# Patient Record
Sex: FEMALE | Race: WHITE | NOT HISPANIC OR LATINO
[De-identification: names, ages, dates, MRNs, and addresses within clinical notes are randomized per-mention and may not be internally consistent; named-entity substitution may affect disease eponyms.]

---

## 2023-11-09 ENCOUNTER — ASOB RESULT (OUTPATIENT)
Age: 28
End: 2023-11-09

## 2023-11-09 ENCOUNTER — TRANSCRIPTION ENCOUNTER (OUTPATIENT)
Age: 28
End: 2023-11-09

## 2023-11-09 ENCOUNTER — APPOINTMENT (OUTPATIENT)
Dept: ANTEPARTUM | Facility: CLINIC | Age: 28
End: 2023-11-09
Payer: COMMERCIAL

## 2023-11-09 PROCEDURE — 76802 OB US < 14 WKS ADDL FETUS: CPT

## 2023-11-09 PROCEDURE — 76817 TRANSVAGINAL US OBSTETRIC: CPT

## 2023-11-09 PROCEDURE — 76801 OB US < 14 WKS SINGLE FETUS: CPT

## 2023-11-20 ENCOUNTER — APPOINTMENT (OUTPATIENT)
Dept: ANTEPARTUM | Facility: CLINIC | Age: 28
End: 2023-11-20
Payer: COMMERCIAL

## 2023-11-20 ENCOUNTER — ASOB RESULT (OUTPATIENT)
Age: 28
End: 2023-11-20

## 2023-11-20 PROCEDURE — 36415 COLL VENOUS BLD VENIPUNCTURE: CPT

## 2023-11-20 PROCEDURE — 76814 OB US NUCHAL MEAS ADD-ON: CPT

## 2023-11-20 PROCEDURE — 76813 OB US NUCHAL MEAS 1 GEST: CPT

## 2023-11-29 ENCOUNTER — APPOINTMENT (OUTPATIENT)
Dept: ANTEPARTUM | Facility: CLINIC | Age: 28
End: 2023-11-29
Payer: COMMERCIAL

## 2023-11-29 ENCOUNTER — ASOB RESULT (OUTPATIENT)
Age: 28
End: 2023-11-29

## 2023-11-29 PROCEDURE — 76945 ECHO GUIDE VILLUS SAMPLING: CPT

## 2023-11-29 PROCEDURE — 59015 CHORION BIOPSY: CPT | Mod: 59

## 2023-12-19 ENCOUNTER — APPOINTMENT (OUTPATIENT)
Dept: ANTEPARTUM | Facility: CLINIC | Age: 28
End: 2023-12-19
Payer: COMMERCIAL

## 2023-12-19 ENCOUNTER — ASOB RESULT (OUTPATIENT)
Age: 28
End: 2023-12-19

## 2023-12-19 PROCEDURE — 76805 OB US >/= 14 WKS SNGL FETUS: CPT

## 2023-12-19 PROCEDURE — 76817 TRANSVAGINAL US OBSTETRIC: CPT

## 2024-01-23 ENCOUNTER — APPOINTMENT (OUTPATIENT)
Dept: ANTEPARTUM | Facility: CLINIC | Age: 29
End: 2024-01-23
Payer: COMMERCIAL

## 2024-01-23 ENCOUNTER — ASOB RESULT (OUTPATIENT)
Age: 29
End: 2024-01-23

## 2024-01-23 PROCEDURE — 76817 TRANSVAGINAL US OBSTETRIC: CPT

## 2024-01-23 PROCEDURE — 76811 OB US DETAILED SNGL FETUS: CPT

## 2024-01-23 PROCEDURE — 76812 OB US DETAILED ADDL FETUS: CPT

## 2024-02-05 ENCOUNTER — APPOINTMENT (OUTPATIENT)
Dept: ANTEPARTUM | Facility: CLINIC | Age: 29
End: 2024-02-05
Payer: COMMERCIAL

## 2024-02-05 ENCOUNTER — ASOB RESULT (OUTPATIENT)
Age: 29
End: 2024-02-05

## 2024-02-05 PROCEDURE — 76817 TRANSVAGINAL US OBSTETRIC: CPT

## 2024-02-05 PROCEDURE — 76815 OB US LIMITED FETUS(S): CPT

## 2024-02-27 ENCOUNTER — APPOINTMENT (OUTPATIENT)
Dept: ANTEPARTUM | Facility: CLINIC | Age: 29
End: 2024-02-27

## 2024-03-05 ENCOUNTER — ASOB RESULT (OUTPATIENT)
Age: 29
End: 2024-03-05

## 2024-03-05 ENCOUNTER — APPOINTMENT (OUTPATIENT)
Dept: ANTEPARTUM | Facility: CLINIC | Age: 29
End: 2024-03-05
Payer: COMMERCIAL

## 2024-03-05 PROCEDURE — 76819 FETAL BIOPHYS PROFIL W/O NST: CPT | Mod: 59

## 2024-03-05 PROCEDURE — 76816 OB US FOLLOW-UP PER FETUS: CPT

## 2024-03-05 PROCEDURE — 76820 UMBILICAL ARTERY ECHO: CPT | Mod: 59

## 2024-03-05 PROCEDURE — 76817 TRANSVAGINAL US OBSTETRIC: CPT

## 2024-03-19 ENCOUNTER — APPOINTMENT (OUTPATIENT)
Dept: ANTEPARTUM | Facility: CLINIC | Age: 29
End: 2024-03-19
Payer: COMMERCIAL

## 2024-03-19 ENCOUNTER — ASOB RESULT (OUTPATIENT)
Age: 29
End: 2024-03-19

## 2024-03-19 PROCEDURE — 76816 OB US FOLLOW-UP PER FETUS: CPT

## 2024-03-19 PROCEDURE — 76817 TRANSVAGINAL US OBSTETRIC: CPT

## 2024-03-19 PROCEDURE — 76821 MIDDLE CEREBRAL ARTERY ECHO: CPT | Mod: 59

## 2024-03-19 PROCEDURE — 76820 UMBILICAL ARTERY ECHO: CPT | Mod: 59

## 2024-03-19 PROCEDURE — 76819 FETAL BIOPHYS PROFIL W/O NST: CPT | Mod: 59

## 2024-03-27 ENCOUNTER — APPOINTMENT (OUTPATIENT)
Dept: ANTEPARTUM | Facility: CLINIC | Age: 29
End: 2024-03-27
Payer: COMMERCIAL

## 2024-03-27 ENCOUNTER — ASOB RESULT (OUTPATIENT)
Age: 29
End: 2024-03-27

## 2024-03-27 PROCEDURE — 76821 MIDDLE CEREBRAL ARTERY ECHO: CPT

## 2024-03-27 PROCEDURE — 76820 UMBILICAL ARTERY ECHO: CPT

## 2024-03-27 PROCEDURE — 76819 FETAL BIOPHYS PROFIL W/O NST: CPT | Mod: 59

## 2024-04-02 ENCOUNTER — ASOB RESULT (OUTPATIENT)
Age: 29
End: 2024-04-02

## 2024-04-02 ENCOUNTER — APPOINTMENT (OUTPATIENT)
Dept: ANTEPARTUM | Facility: CLINIC | Age: 29
End: 2024-04-02
Payer: COMMERCIAL

## 2024-04-02 PROBLEM — Z00.00 ENCOUNTER FOR PREVENTIVE HEALTH EXAMINATION: Status: ACTIVE | Noted: 2024-04-02

## 2024-04-02 PROCEDURE — 76817 TRANSVAGINAL US OBSTETRIC: CPT

## 2024-04-02 PROCEDURE — 76816 OB US FOLLOW-UP PER FETUS: CPT

## 2024-04-02 PROCEDURE — 76821 MIDDLE CEREBRAL ARTERY ECHO: CPT | Mod: 59

## 2024-04-02 PROCEDURE — 76820 UMBILICAL ARTERY ECHO: CPT | Mod: 59

## 2024-04-02 PROCEDURE — 76819 FETAL BIOPHYS PROFIL W/O NST: CPT | Mod: 59

## 2024-04-09 ENCOUNTER — APPOINTMENT (OUTPATIENT)
Dept: ANTEPARTUM | Facility: CLINIC | Age: 29
End: 2024-04-09

## 2024-04-16 ENCOUNTER — APPOINTMENT (OUTPATIENT)
Dept: ANTEPARTUM | Facility: CLINIC | Age: 29
End: 2024-04-16
Payer: COMMERCIAL

## 2024-04-16 ENCOUNTER — ASOB RESULT (OUTPATIENT)
Age: 29
End: 2024-04-16

## 2024-04-16 PROCEDURE — 76819 FETAL BIOPHYS PROFIL W/O NST: CPT

## 2024-04-16 PROCEDURE — 76820 UMBILICAL ARTERY ECHO: CPT | Mod: 59

## 2024-04-16 PROCEDURE — 76816 OB US FOLLOW-UP PER FETUS: CPT | Mod: 59

## 2024-04-16 PROCEDURE — 76817 TRANSVAGINAL US OBSTETRIC: CPT

## 2024-04-23 ENCOUNTER — APPOINTMENT (OUTPATIENT)
Dept: ANTEPARTUM | Facility: CLINIC | Age: 29
End: 2024-04-23

## 2024-04-30 ENCOUNTER — APPOINTMENT (OUTPATIENT)
Dept: ANTEPARTUM | Facility: CLINIC | Age: 29
End: 2024-04-30
Payer: COMMERCIAL

## 2024-04-30 ENCOUNTER — ASOB RESULT (OUTPATIENT)
Age: 29
End: 2024-04-30

## 2024-04-30 PROCEDURE — 76820 UMBILICAL ARTERY ECHO: CPT | Mod: 59

## 2024-04-30 PROCEDURE — 76816 OB US FOLLOW-UP PER FETUS: CPT

## 2024-04-30 PROCEDURE — 76819 FETAL BIOPHYS PROFIL W/O NST: CPT | Mod: 59

## 2024-05-02 ENCOUNTER — APPOINTMENT (OUTPATIENT)
Dept: ORTHOPEDIC SURGERY | Facility: CLINIC | Age: 29
End: 2024-05-02

## 2024-05-09 ENCOUNTER — APPOINTMENT (OUTPATIENT)
Dept: ANTEPARTUM | Facility: CLINIC | Age: 29
End: 2024-05-09
Payer: COMMERCIAL

## 2024-05-09 ENCOUNTER — ASOB RESULT (OUTPATIENT)
Age: 29
End: 2024-05-09

## 2024-05-09 PROCEDURE — 76815 OB US LIMITED FETUS(S): CPT

## 2024-05-09 PROCEDURE — 76820 UMBILICAL ARTERY ECHO: CPT | Mod: 59

## 2024-05-09 PROCEDURE — 76819 FETAL BIOPHYS PROFIL W/O NST: CPT

## 2024-05-14 ENCOUNTER — APPOINTMENT (OUTPATIENT)
Dept: ANTEPARTUM | Facility: CLINIC | Age: 29
End: 2024-05-14
Payer: COMMERCIAL

## 2024-05-14 ENCOUNTER — ASOB RESULT (OUTPATIENT)
Age: 29
End: 2024-05-14

## 2024-05-14 PROCEDURE — 76820 UMBILICAL ARTERY ECHO: CPT | Mod: 59

## 2024-05-14 PROCEDURE — 76819 FETAL BIOPHYS PROFIL W/O NST: CPT

## 2024-05-14 PROCEDURE — 76816 OB US FOLLOW-UP PER FETUS: CPT

## 2024-05-16 ENCOUNTER — OUTPATIENT (OUTPATIENT)
Dept: OUTPATIENT SERVICES | Facility: HOSPITAL | Age: 29
LOS: 1 days | End: 2024-05-16
Payer: COMMERCIAL

## 2024-05-16 ENCOUNTER — TRANSCRIPTION ENCOUNTER (OUTPATIENT)
Age: 29
End: 2024-05-16

## 2024-05-16 VITALS
HEART RATE: 91 BPM | SYSTOLIC BLOOD PRESSURE: 111 MMHG | RESPIRATION RATE: 16 BRPM | OXYGEN SATURATION: 100 % | DIASTOLIC BLOOD PRESSURE: 67 MMHG | TEMPERATURE: 98 F

## 2024-05-16 DIAGNOSIS — O26.899 OTHER SPECIFIED PREGNANCY RELATED CONDITIONS, UNSPECIFIED TRIMESTER: ICD-10-CM

## 2024-05-16 LAB
ALBUMIN SERPL ELPH-MCNC: 3.6 G/DL — SIGNIFICANT CHANGE UP (ref 3.3–5)
ALP SERPL-CCNC: 196 U/L — HIGH (ref 40–120)
ALT FLD-CCNC: 10 U/L — SIGNIFICANT CHANGE UP (ref 10–45)
ANION GAP SERPL CALC-SCNC: 14 MMOL/L — SIGNIFICANT CHANGE UP (ref 5–17)
AST SERPL-CCNC: 15 U/L — SIGNIFICANT CHANGE UP (ref 10–40)
BASOPHILS # BLD AUTO: 0.04 K/UL — SIGNIFICANT CHANGE UP (ref 0–0.2)
BASOPHILS NFR BLD AUTO: 0.4 % — SIGNIFICANT CHANGE UP (ref 0–2)
BILIRUB SERPL-MCNC: 0.3 MG/DL — SIGNIFICANT CHANGE UP (ref 0.2–1.2)
BUN SERPL-MCNC: 7 MG/DL — SIGNIFICANT CHANGE UP (ref 7–23)
CALCIUM SERPL-MCNC: 9.5 MG/DL — SIGNIFICANT CHANGE UP (ref 8.4–10.5)
CHLORIDE SERPL-SCNC: 104 MMOL/L — SIGNIFICANT CHANGE UP (ref 96–108)
CO2 SERPL-SCNC: 20 MMOL/L — LOW (ref 22–31)
CREAT ?TM UR-MCNC: 26 MG/DL — SIGNIFICANT CHANGE UP
CREAT SERPL-MCNC: 0.62 MG/DL — SIGNIFICANT CHANGE UP (ref 0.5–1.3)
EGFR: 123 ML/MIN/1.73M2 — SIGNIFICANT CHANGE UP
EOSINOPHIL # BLD AUTO: 0.05 K/UL — SIGNIFICANT CHANGE UP (ref 0–0.5)
EOSINOPHIL NFR BLD AUTO: 0.5 % — SIGNIFICANT CHANGE UP (ref 0–6)
FIBRINOGEN PPP-MCNC: 702 MG/DL — HIGH (ref 200–445)
GLUCOSE SERPL-MCNC: 101 MG/DL — HIGH (ref 70–99)
HCT VFR BLD CALC: 32.5 % — LOW (ref 34.5–45)
HGB BLD-MCNC: 11.4 G/DL — LOW (ref 11.5–15.5)
IMM GRANULOCYTES NFR BLD AUTO: 2 % — HIGH (ref 0–0.9)
LDH SERPL L TO P-CCNC: 170 U/L — SIGNIFICANT CHANGE UP (ref 50–242)
LYMPHOCYTES # BLD AUTO: 1.38 K/UL — SIGNIFICANT CHANGE UP (ref 1–3.3)
LYMPHOCYTES # BLD AUTO: 12.8 % — LOW (ref 13–44)
MCHC RBC-ENTMCNC: 32.5 PG — SIGNIFICANT CHANGE UP (ref 27–34)
MCHC RBC-ENTMCNC: 35.1 GM/DL — SIGNIFICANT CHANGE UP (ref 32–36)
MCV RBC AUTO: 92.6 FL — SIGNIFICANT CHANGE UP (ref 80–100)
MONOCYTES # BLD AUTO: 0.51 K/UL — SIGNIFICANT CHANGE UP (ref 0–0.9)
MONOCYTES NFR BLD AUTO: 4.7 % — SIGNIFICANT CHANGE UP (ref 2–14)
NEUTROPHILS # BLD AUTO: 8.61 K/UL — HIGH (ref 1.8–7.4)
NEUTROPHILS NFR BLD AUTO: 79.6 % — HIGH (ref 43–77)
NRBC # BLD: 0 /100 WBCS — SIGNIFICANT CHANGE UP (ref 0–0)
PLATELET # BLD AUTO: 228 K/UL — SIGNIFICANT CHANGE UP (ref 150–400)
POTASSIUM SERPL-MCNC: 3.9 MMOL/L — SIGNIFICANT CHANGE UP (ref 3.5–5.3)
POTASSIUM SERPL-SCNC: 3.9 MMOL/L — SIGNIFICANT CHANGE UP (ref 3.5–5.3)
PROT ?TM UR-MCNC: 5 MG/DL — SIGNIFICANT CHANGE UP (ref 0–12)
PROT SERPL-MCNC: 6.7 G/DL — SIGNIFICANT CHANGE UP (ref 6–8.3)
PROT/CREAT UR-RTO: 0.2 RATIO — SIGNIFICANT CHANGE UP (ref 0–0.2)
RBC # BLD: 3.51 M/UL — LOW (ref 3.8–5.2)
RBC # FLD: 13.4 % — SIGNIFICANT CHANGE UP (ref 10.3–14.5)
SODIUM SERPL-SCNC: 138 MMOL/L — SIGNIFICANT CHANGE UP (ref 135–145)
URATE SERPL-MCNC: 4.2 MG/DL — SIGNIFICANT CHANGE UP (ref 2.5–7)
WBC # BLD: 10.81 K/UL — HIGH (ref 3.8–10.5)
WBC # FLD AUTO: 10.81 K/UL — HIGH (ref 3.8–10.5)

## 2024-05-16 PROCEDURE — 80053 COMPREHEN METABOLIC PANEL: CPT

## 2024-05-16 PROCEDURE — 96374 THER/PROPH/DIAG INJ IV PUSH: CPT

## 2024-05-16 PROCEDURE — 83615 LACTATE (LD) (LDH) ENZYME: CPT

## 2024-05-16 PROCEDURE — 84156 ASSAY OF PROTEIN URINE: CPT

## 2024-05-16 PROCEDURE — 85025 COMPLETE CBC W/AUTO DIFF WBC: CPT

## 2024-05-16 PROCEDURE — 59025 FETAL NON-STRESS TEST: CPT | Mod: 59

## 2024-05-16 PROCEDURE — 82570 ASSAY OF URINE CREATININE: CPT

## 2024-05-16 PROCEDURE — 85384 FIBRINOGEN ACTIVITY: CPT

## 2024-05-16 PROCEDURE — 84550 ASSAY OF BLOOD/URIC ACID: CPT

## 2024-05-16 PROCEDURE — 36415 COLL VENOUS BLD VENIPUNCTURE: CPT

## 2024-05-16 PROCEDURE — 99214 OFFICE O/P EST MOD 30 MIN: CPT

## 2024-05-16 RX ORDER — ACETAMINOPHEN 500 MG
1000 TABLET ORAL ONCE
Refills: 0 | Status: COMPLETED | OUTPATIENT
Start: 2024-05-16 | End: 2024-05-16

## 2024-05-16 RX ADMIN — Medication 400 MILLIGRAM(S): at 15:10

## 2024-05-16 NOTE — OB PROVIDER TRIAGE NOTE - HISTORY OF PRESENT ILLNESS
28y  with lalito twins at 37w1d presents from office with an elevated BP, she denies headache blurry vision, SOB, RUQ pain. Denies regular contractions, leakage of fluid, vaginal bleeding. Endorses fetal movement. Of note, patient has had left sided back pain for five weeks after a back sprain that has acutely worsened in the past three days. Patient states that she has been sitting in a chair to sleep due to the pain for the past three nights.    Ante:  -spontaneous pregnancy  -NIPT inconclusive, CVS wnl  -anatomy showed baby B with club foot  -GCT wnl  -no hypertensive disorders of pregnancy  -GBS negative  -EFW A  -EFW B:    Ob Hx: G1    Gyn Hx: denies    PMHx: L5/S1 degenerating disc    PSHx: tonsillectomy    Meds: claritin    Allergies: NKDA    Physical Exam:  /64 HR 84  General: uncomfortable appearing, crying  Abdomen: soft, non-tender, gravid  TAUS: Baby A : chele breech, Baby B chele breech  Neuro: 2+ reflexes bilaterally  FHT: A 145 baseline, moderate variability, +accels, no decels  FHT B: 150 baseline, moderate variability, +accels no decels  [place]:    A/P  28y G_P_ at XwXd presents for induction of labor.  -admit to labor and delivery  -induce with ramsey balloon/pitocin/cervidil/cytotec  -GBS negative  -epidural PRN  -consents signed, all questions answered, patient expressed understanding  -discussed with senior resident X  -discussed with attending X 28y  with lalito twins at 37w1d presents from office with an elevated BP, she denies headache blurry vision, SOB, RUQ pain. Denies regular contractions, leakage of fluid, vaginal bleeding. Endorses fetal movement. Of note, patient has had left sided back pain for five weeks after a back sprain that has acutely worsened in the past three days. Patient states that she has been sitting in a chair to sleep due to the pain for the past three nights.    Ante:  -spontaneous pregnancy  -NIPT inconclusive, CVS wnl  -anatomy showed baby B with club foot  -GCT wnl  -no hypertensive disorders of pregnancy  -GBS negative  -EFW A  -EFW B:    Ob Hx: G1    Gyn Hx: denies    PMHx: L5/S1 degenerating disc    PSHx: tonsillectomy    Meds: claritin    Allergies: NKDA    Physical Exam:  /64 HR 84  General: uncomfortable appearing, crying  Abdomen: soft, non-tender, gravid  TAUS: Baby A : chele breech, Baby B chele breech  Neuro: 2+ reflexes bilaterally  FHT: A 145 baseline, moderate variability, +accels, no decels BPP 8/8  FHT B: 150 baseline, moderate variability, +accels no decels BPP 8/8    A/P  28y  with lalito twins at 37w1d presents from office with an elevated BP in office. Patient with no labor or PEC complaints however was complaining about back pain that was relieved slightly with ofirmev. VSS, normotensive while in triage, NST reactive and reassuring for baby A and baby B, BPP 8/8 for both twins, PEC labs wnl, P:C 0.2 Patient will follow up with obgyn in office tomorrow. All questions answered, patient expressed understanding. Discussed with senior resident Dr. Turner and attending Dr. Stoddard

## 2024-05-16 NOTE — OB RN TRIAGE NOTE - FALL HARM RISK - UNIVERSAL INTERVENTIONS
Bed in lowest position, wheels locked, appropriate side rails in place/Call bell, personal items and telephone in reach/Instruct patient to call for assistance before getting out of bed or chair/Non-slip footwear when patient is out of bed/[place] to call system/Physically safe environment - no spills, clutter or unnecessary equipment/Purposeful Proactive Rounding/Room/bathroom lighting operational, light cord in reach

## 2024-05-16 NOTE — OB RN TRIAGE NOTE - CHIEF COMPLAINT QUOTE
I had an elevated blood pressure at the doctor's office and am having a lot of back pain since 5 wks ago; it's gotten worse in the past 3 days.

## 2024-05-16 NOTE — OB PROVIDER TRIAGE NOTE - PATIENT'S GENDER IDENTITY
[FreeTextEntry1] : Patient completed here echocardiogram on 9/1/2020. Echo showed moderately reduced LVEF ~40%, dilated left atrium, and normal pulmonary pressures.\par She will be started on Entresto 24-26 mg BID with plans to uptitrate as tolerated post-operatively. She will continue her beta-blocker perioperatively, with plans to uptitrate this as well, postoperatively.\par Diuresis as tolerated.\par She can hold her apixaban for 3 days prior to her surgery and can resume anticoagulation when cleared by her surgeon to do so postoperatively. \par \par No further cardiovascular testing is necessary at this time prior to her necessary surgery.\par Plan for tele-health visit in two weeks (post-op) for medication titration. Female MOLECULAR PCR

## 2024-05-16 NOTE — OB RN TRIAGE NOTE - NSNURSINGINSTR_OBGYN_ALL_OB_FT
Patient discharged to home; patient provided with discharge instructions by Dr. Redd. Patient verbally indicates understanding of discharge instructions. Vital signs wnl.

## 2024-05-16 NOTE — OB RN TRIAGE NOTE - GRAVIDA, OB PROFILE
Anxiety    Chronic back pain greater than 3 months duration    Depression    HTN (Hypertension)    Insomnia    Lumbar disc displacement without myelopathy    NPH (normal pressure hydrocephalus)    Sciatica    Small intestine disorder  "ilitis"   steroids   1969  Vertebral Sciatica
1

## 2024-05-17 ENCOUNTER — INPATIENT (INPATIENT)
Facility: HOSPITAL | Age: 29
LOS: 3 days | Discharge: HOME | End: 2024-05-21
Attending: OBSTETRICS & GYNECOLOGY | Admitting: OBSTETRICS & GYNECOLOGY
Payer: COMMERCIAL

## 2024-05-17 VITALS
WEIGHT: 211.64 LBS | HEART RATE: 75 BPM | RESPIRATION RATE: 18 BRPM | SYSTOLIC BLOOD PRESSURE: 107 MMHG | TEMPERATURE: 100 F | OXYGEN SATURATION: 100 % | DIASTOLIC BLOOD PRESSURE: 73 MMHG | HEIGHT: 67 IN

## 2024-05-17 DIAGNOSIS — O26.899 OTHER SPECIFIED PREGNANCY RELATED CONDITIONS, UNSPECIFIED TRIMESTER: ICD-10-CM

## 2024-05-17 DIAGNOSIS — Z90.89 ACQUIRED ABSENCE OF OTHER ORGANS: Chronic | ICD-10-CM

## 2024-05-17 LAB
ALBUMIN SERPL ELPH-MCNC: 3.7 G/DL — SIGNIFICANT CHANGE UP (ref 3.3–5)
ALP SERPL-CCNC: 203 U/L — HIGH (ref 40–120)
ALT FLD-CCNC: 12 U/L — SIGNIFICANT CHANGE UP (ref 10–45)
ANION GAP SERPL CALC-SCNC: 12 MMOL/L — SIGNIFICANT CHANGE UP (ref 5–17)
AST SERPL-CCNC: 19 U/L — SIGNIFICANT CHANGE UP (ref 10–40)
BASOPHILS # BLD AUTO: 0.05 K/UL — SIGNIFICANT CHANGE UP (ref 0–0.2)
BASOPHILS NFR BLD AUTO: 0.4 % — SIGNIFICANT CHANGE UP (ref 0–2)
BILIRUB SERPL-MCNC: 0.5 MG/DL — SIGNIFICANT CHANGE UP (ref 0.2–1.2)
BLD GP AB SCN SERPL QL: NEGATIVE — SIGNIFICANT CHANGE UP
BUN SERPL-MCNC: 9 MG/DL — SIGNIFICANT CHANGE UP (ref 7–23)
CALCIUM SERPL-MCNC: 10 MG/DL — SIGNIFICANT CHANGE UP (ref 8.4–10.5)
CHLORIDE SERPL-SCNC: 105 MMOL/L — SIGNIFICANT CHANGE UP (ref 96–108)
CO2 SERPL-SCNC: 23 MMOL/L — SIGNIFICANT CHANGE UP (ref 22–31)
CREAT ?TM UR-MCNC: 124 MG/DL — SIGNIFICANT CHANGE UP
CREAT SERPL-MCNC: 0.7 MG/DL — SIGNIFICANT CHANGE UP (ref 0.5–1.3)
EGFR: 119 ML/MIN/1.73M2 — SIGNIFICANT CHANGE UP
EOSINOPHIL # BLD AUTO: 0.05 K/UL — SIGNIFICANT CHANGE UP (ref 0–0.5)
EOSINOPHIL NFR BLD AUTO: 0.4 % — SIGNIFICANT CHANGE UP (ref 0–6)
FIBRINOGEN PPP-MCNC: 704 MG/DL — HIGH (ref 200–445)
GLUCOSE SERPL-MCNC: 82 MG/DL — SIGNIFICANT CHANGE UP (ref 70–99)
HCT VFR BLD CALC: 34.7 % — SIGNIFICANT CHANGE UP (ref 34.5–45)
HGB BLD-MCNC: 11.9 G/DL — SIGNIFICANT CHANGE UP (ref 11.5–15.5)
IMM GRANULOCYTES NFR BLD AUTO: 1.1 % — HIGH (ref 0–0.9)
LDH SERPL L TO P-CCNC: 170 U/L — SIGNIFICANT CHANGE UP (ref 50–242)
LYMPHOCYTES # BLD AUTO: 1.71 K/UL — SIGNIFICANT CHANGE UP (ref 1–3.3)
LYMPHOCYTES # BLD AUTO: 15.1 % — SIGNIFICANT CHANGE UP (ref 13–44)
MCHC RBC-ENTMCNC: 32.6 PG — SIGNIFICANT CHANGE UP (ref 27–34)
MCHC RBC-ENTMCNC: 34.3 GM/DL — SIGNIFICANT CHANGE UP (ref 32–36)
MCV RBC AUTO: 95.1 FL — SIGNIFICANT CHANGE UP (ref 80–100)
MONOCYTES # BLD AUTO: 0.66 K/UL — SIGNIFICANT CHANGE UP (ref 0–0.9)
MONOCYTES NFR BLD AUTO: 5.8 % — SIGNIFICANT CHANGE UP (ref 2–14)
NEUTROPHILS # BLD AUTO: 8.71 K/UL — HIGH (ref 1.8–7.4)
NEUTROPHILS NFR BLD AUTO: 77.2 % — HIGH (ref 43–77)
NRBC # BLD: 0 /100 WBCS — SIGNIFICANT CHANGE UP (ref 0–0)
PLATELET # BLD AUTO: 250 K/UL — SIGNIFICANT CHANGE UP (ref 150–400)
POTASSIUM SERPL-MCNC: 4.2 MMOL/L — SIGNIFICANT CHANGE UP (ref 3.5–5.3)
POTASSIUM SERPL-SCNC: 4.2 MMOL/L — SIGNIFICANT CHANGE UP (ref 3.5–5.3)
PROT ?TM UR-MCNC: 18 MG/DL — HIGH (ref 0–12)
PROT SERPL-MCNC: 6.7 G/DL — SIGNIFICANT CHANGE UP (ref 6–8.3)
PROT/CREAT UR-RTO: 0.1 RATIO — SIGNIFICANT CHANGE UP (ref 0–0.2)
RBC # BLD: 3.65 M/UL — LOW (ref 3.8–5.2)
RBC # FLD: 13.3 % — SIGNIFICANT CHANGE UP (ref 10.3–14.5)
RH IG SCN BLD-IMP: POSITIVE — SIGNIFICANT CHANGE UP
RH IG SCN BLD-IMP: POSITIVE — SIGNIFICANT CHANGE UP
SODIUM SERPL-SCNC: 140 MMOL/L — SIGNIFICANT CHANGE UP (ref 135–145)
URATE SERPL-MCNC: 4.4 MG/DL — SIGNIFICANT CHANGE UP (ref 2.5–7)
WBC # BLD: 11.3 K/UL — HIGH (ref 3.8–10.5)
WBC # FLD AUTO: 11.3 K/UL — HIGH (ref 3.8–10.5)

## 2024-05-17 PROCEDURE — 88307 TISSUE EXAM BY PATHOLOGIST: CPT | Mod: 26

## 2024-05-17 DEVICE — SEPRAFILM 5 X 6": Type: IMPLANTABLE DEVICE | Status: FUNCTIONAL

## 2024-05-17 RX ORDER — KETOROLAC TROMETHAMINE 30 MG/ML
30 SYRINGE (ML) INJECTION EVERY 6 HOURS
Refills: 0 | Status: DISCONTINUED | OUTPATIENT
Start: 2024-05-17 | End: 2024-05-18

## 2024-05-17 RX ORDER — SODIUM CHLORIDE 9 MG/ML
1000 INJECTION, SOLUTION INTRAVENOUS
Refills: 0 | Status: DISCONTINUED | OUTPATIENT
Start: 2024-05-17 | End: 2024-05-21

## 2024-05-17 RX ORDER — CEFAZOLIN SODIUM 1 G
2000 VIAL (EA) INJECTION ONCE
Refills: 0 | Status: COMPLETED | OUTPATIENT
Start: 2024-05-17 | End: 2024-05-17

## 2024-05-17 RX ORDER — CITRIC ACID/SODIUM CITRATE 300-500 MG
30 SOLUTION, ORAL ORAL ONCE
Refills: 0 | Status: COMPLETED | OUTPATIENT
Start: 2024-05-17 | End: 2024-05-17

## 2024-05-17 RX ORDER — ACETAMINOPHEN 500 MG
1000 TABLET ORAL ONCE
Refills: 0 | Status: COMPLETED | OUTPATIENT
Start: 2024-05-17 | End: 2024-05-17

## 2024-05-17 RX ORDER — LANOLIN
1 OINTMENT (GRAM) TOPICAL EVERY 6 HOURS
Refills: 0 | Status: DISCONTINUED | OUTPATIENT
Start: 2024-05-17 | End: 2024-05-21

## 2024-05-17 RX ORDER — FAMOTIDINE 10 MG/ML
20 INJECTION INTRAVENOUS ONCE
Refills: 0 | Status: COMPLETED | OUTPATIENT
Start: 2024-05-17 | End: 2024-05-17

## 2024-05-17 RX ORDER — OXYTOCIN 10 UNIT/ML
333.33 VIAL (ML) INJECTION
Qty: 20 | Refills: 0 | Status: DISCONTINUED | OUTPATIENT
Start: 2024-05-17 | End: 2024-05-17

## 2024-05-17 RX ORDER — OXYCODONE HYDROCHLORIDE 5 MG/1
5 TABLET ORAL
Refills: 0 | Status: DISCONTINUED | OUTPATIENT
Start: 2024-05-17 | End: 2024-05-21

## 2024-05-17 RX ORDER — TETANUS TOXOID, REDUCED DIPHTHERIA TOXOID AND ACELLULAR PERTUSSIS VACCINE, ADSORBED 5; 2.5; 8; 8; 2.5 [IU]/.5ML; [IU]/.5ML; UG/.5ML; UG/.5ML; UG/.5ML
0.5 SUSPENSION INTRAMUSCULAR ONCE
Refills: 0 | Status: DISCONTINUED | OUTPATIENT
Start: 2024-05-17 | End: 2024-05-21

## 2024-05-17 RX ORDER — ACETAMINOPHEN 500 MG
975 TABLET ORAL
Refills: 0 | Status: DISCONTINUED | OUTPATIENT
Start: 2024-05-17 | End: 2024-05-21

## 2024-05-17 RX ORDER — DIPHENHYDRAMINE HCL 50 MG
25 CAPSULE ORAL EVERY 6 HOURS
Refills: 0 | Status: DISCONTINUED | OUTPATIENT
Start: 2024-05-17 | End: 2024-05-21

## 2024-05-17 RX ORDER — OXYCODONE HYDROCHLORIDE 5 MG/1
5 TABLET ORAL ONCE
Refills: 0 | Status: DISCONTINUED | OUTPATIENT
Start: 2024-05-17 | End: 2024-05-21

## 2024-05-17 RX ORDER — OXYTOCIN 10 UNIT/ML
333.33 VIAL (ML) INJECTION
Qty: 20 | Refills: 0 | Status: DISCONTINUED | OUTPATIENT
Start: 2024-05-17 | End: 2024-05-21

## 2024-05-17 RX ORDER — MORPHINE SULFATE 50 MG/1
0.15 CAPSULE, EXTENDED RELEASE ORAL ONCE
Refills: 0 | Status: DISCONTINUED | OUTPATIENT
Start: 2024-05-17 | End: 2024-05-17

## 2024-05-17 RX ORDER — IBUPROFEN 200 MG
600 TABLET ORAL EVERY 6 HOURS
Refills: 0 | Status: COMPLETED | OUTPATIENT
Start: 2024-05-17 | End: 2025-04-15

## 2024-05-17 RX ORDER — SODIUM CHLORIDE 9 MG/ML
1000 INJECTION, SOLUTION INTRAVENOUS
Refills: 0 | Status: DISCONTINUED | OUTPATIENT
Start: 2024-05-17 | End: 2024-05-17

## 2024-05-17 RX ORDER — CHLORHEXIDINE GLUCONATE 213 G/1000ML
1 SOLUTION TOPICAL DAILY
Refills: 0 | Status: DISCONTINUED | OUTPATIENT
Start: 2024-05-17 | End: 2024-05-17

## 2024-05-17 RX ORDER — DEXAMETHASONE 0.5 MG/5ML
4 ELIXIR ORAL EVERY 6 HOURS
Refills: 0 | Status: DISCONTINUED | OUTPATIENT
Start: 2024-05-17 | End: 2024-05-17

## 2024-05-17 RX ORDER — SIMETHICONE 80 MG/1
80 TABLET, CHEWABLE ORAL EVERY 4 HOURS
Refills: 0 | Status: DISCONTINUED | OUTPATIENT
Start: 2024-05-17 | End: 2024-05-21

## 2024-05-17 RX ORDER — ONDANSETRON 8 MG/1
4 TABLET, FILM COATED ORAL EVERY 6 HOURS
Refills: 0 | Status: DISCONTINUED | OUTPATIENT
Start: 2024-05-17 | End: 2024-05-17

## 2024-05-17 RX ORDER — NALOXONE HYDROCHLORIDE 4 MG/.1ML
0.1 SPRAY NASAL
Refills: 0 | Status: DISCONTINUED | OUTPATIENT
Start: 2024-05-17 | End: 2024-05-17

## 2024-05-17 RX ORDER — MAGNESIUM HYDROXIDE 400 MG/1
30 TABLET, CHEWABLE ORAL
Refills: 0 | Status: DISCONTINUED | OUTPATIENT
Start: 2024-05-17 | End: 2024-05-21

## 2024-05-17 RX ORDER — ENOXAPARIN SODIUM 100 MG/ML
40 INJECTION SUBCUTANEOUS EVERY 24 HOURS
Refills: 0 | Status: DISCONTINUED | OUTPATIENT
Start: 2024-05-18 | End: 2024-05-21

## 2024-05-17 RX ADMIN — Medication 400 MILLIGRAM(S): at 12:35

## 2024-05-17 RX ADMIN — Medication 30 MILLIGRAM(S): at 23:51

## 2024-05-17 RX ADMIN — Medication 1000 MILLIUNIT(S)/MIN: at 15:41

## 2024-05-17 RX ADMIN — Medication 1000 MILLIGRAM(S): at 13:05

## 2024-05-17 RX ADMIN — SODIUM CHLORIDE 125 MILLILITER(S): 9 INJECTION, SOLUTION INTRAVENOUS at 13:15

## 2024-05-17 RX ADMIN — Medication 1000 MILLIUNIT(S)/MIN: at 16:21

## 2024-05-17 RX ADMIN — CHLORHEXIDINE GLUCONATE 1 APPLICATION(S): 213 SOLUTION TOPICAL at 13:35

## 2024-05-17 RX ADMIN — Medication 100 MILLIGRAM(S): at 14:52

## 2024-05-17 RX ADMIN — Medication 30 MILLIGRAM(S): at 18:10

## 2024-05-17 RX ADMIN — FAMOTIDINE 20 MILLIGRAM(S): 10 INJECTION INTRAVENOUS at 13:45

## 2024-05-17 RX ADMIN — Medication 30 MILLILITER(S): at 14:52

## 2024-05-17 RX ADMIN — SIMETHICONE 80 MILLIGRAM(S): 80 TABLET, CHEWABLE ORAL at 20:43

## 2024-05-17 RX ADMIN — Medication 30 MILLIGRAM(S): at 18:00

## 2024-05-17 RX ADMIN — SODIUM CHLORIDE 200 MILLILITER(S): 9 INJECTION, SOLUTION INTRAVENOUS at 12:31

## 2024-05-17 RX ADMIN — Medication 975 MILLIGRAM(S): at 20:42

## 2024-05-17 NOTE — OB RN INTRAOPERATIVE NOTE - NS_DELIVERYATTENDING1_OBGYN_ALL_OB_FT
Left voice message asking for a return call to schedule for Lumbar L4-5 and L5-S1 TFESI with Pain Management.  Referred by Dr. Wolff.  
Shahnaz Russell MD

## 2024-05-17 NOTE — OB RN INTRAOPERATIVE NOTE - NSSELHIDDEN_OBGYN_ALL_OB_FT
[NS_DeliveryAttending1_OBGYN_ALL_OB_FT:[id number]==],[NS_DeliveryAssist1_OBGYN_ALL_OB_FT:[id number]=],[NS_DeliveryRN_OBGYN_ALL_OB_FT:[id number]=],[NS_CirculateRN2_OBGYN_ALL_OB_FT:[id number]=]

## 2024-05-17 NOTE — PRE-ANESTHESIA EVALUATION ADULT - NSANTHRISKNONERD_GEN_ALL_CORE
Called patient to discuss alert. Patient denies experiencing symptoms of chest pain, SOB, palpitations, dizziness, lightheadedness, or fatigue. She had just finished cleaning the bathroom and was getting dinner ready. Will continue to monitor. Patient appreciative of call and verbalized understanding. Will call with new or worsening symptoms.     No risk alerts present

## 2024-05-17 NOTE — OB PROVIDER H&P - NSHPPHYSICALEXAM_GEN_ALL_CORE
gen alert nad  abd soft nontender  taus baby A breech, Baby B breech  efm   baby A baseline 130/mod claribel/+acceln/no decel. Cat I.  baby B baseline 130/mod claribel/+accel/no decel. Cat I  [place] q2-3 min

## 2024-05-17 NOTE — PRE-ANESTHESIA EVALUATION ADULT - NSANTHTIREDRD_ENT_A_CORE
Patient escorted out of department for additional testing- to CT
Patient is resting comfortably, mother at bedside.
Pt received and explained discharge instructions, pt verbalized understanding. Pt denies any acute distress and has no complaints at this time.
No

## 2024-05-17 NOTE — OB PROVIDER DELIVERY SUMMARY - NS_ROMTYPE_OBGYN_ALL_OB
S  26w1d presents for routine prenatal care. FOB present.    Positive fetal movement  Denies bleeding, contractions, LOF  Uncertain re: breast or bottle feeding    O   Vitals:    23 1433   BP: 136/68   Pulse: 86   Temp: 97.5 °F (36.4 °C)     Gen: NAD, A&O  Abd: gravid, NT  Ext: No calf pain b/l   FHT & FH: See episode.  A / P   Problem List Items Addressed This Visit    None  Visit Diagnoses     Large for dates    -  Primary    Relevant Orders    POCT US OB FU OR REPEAT IN OFFICE      Anticipatory guidance provided re: 3T labs, tdap, literature provided  Pt declines further resources for classes; states she has the info & is well supported  Kick counts /  labor precautions given  RTC 2 weeks    The 21st Century Cures Act makes medical notes available to patients in the interest of transparency. This is a medical document. It is intended as peer to peer communication. It is written in medical language and may contain abbreviations or verbiage that are unfamiliar. It may appear blunt or direct. Medical documents are intended to carry relevant information, facts as evident, and the clinical opinion of the practitioner.    
AROM

## 2024-05-17 NOTE — OB RN PATIENT PROFILE - NS_PRENATALLOC_OBGYN_ALL_OB
Nutrition:  Certain foods can make swelling and inflammation worse in the body and in turn, this makes pain worse. Think about trying Ezekial bread or bread from [employer] because these are less processed.    Sugar can cause a lot of inflammation as well, try to avoid this.    Other foods will take down inflammation: fruits and vegetables are good at this.  [place] can also help lower inflammation.    Exercise: keep up the good work with this!     Supplements:  Fish oil:  1-2 grams per day of EPA and DHA ([employer] Ultimate [place])  Https://www.[employer]/consumers/     Turmeric: 1-2 grams per day, standardized to 95% curcuminoids, mixed with black pepper (piperine).     https://www.[employer]/product-p/88639.htm       Sleep:  If your sleep gets better, you will notice a decrease in pain and stress.  I think it's important for you to keep the appointment with the ENT and to let your lung doctor know about your CPAP issues.    If you wanted to try something for sleep, I would try melatonin 0.5-3mg at night, 30 minutes before bed, sublingual tends to work best.    Other Options:  Consider acupuncture to help with the pain to your chest.  [email or web address]    [facility]  [address] [place] [address]  [phone]  [facility]  [address]  [address] [place] [address]  [phone]  [place] (cont'd)  [facility]  [address] [address]  Physical Medicine & Rehab  (4th Floor, [address])  [phone]  [facility]  [address]  [place], [place] [id number] [phone]  [facility]  [address]   [place], [place] [id number] [phone]    You can also consider using the diclofenac gel once per day in a thin layer to your chest.    You could try a cream that has  diclofenac, lidocaine, and gabapentin-let me know if you want to try this and I can send it in.    You could try a topical CBD cream.  Topical CBD Products  Charissa's Nutritional Muscle Freeze  Nature's Love Organic Topical ReLeaf Salve  Plus CBD Oil Hemp Balm Extra Strength     MD Office

## 2024-05-17 NOTE — OB PROVIDER DELIVERY SUMMARY - NSSELHIDDEN_OBGYN_ALL_OB_FT
[NS_DeliveryAttending1_OBGYN_ALL_OB_FT:[id number]],[NS_DeliveryAssist1_OBGYN_ALL_OB_FT:[id number]],[NS_DeliveryRN_OBGYN_ALL_OB_FT:[id number]=],[NS_CirculateRN2_OBGYN_ALL_OB_FT:[id number]=]

## 2024-05-17 NOTE — OB RN DELIVERY SUMMARY - NSSELHIDDEN_OBGYN_ALL_OB_FT
[NS_DeliveryAttending1_OBGYN_ALL_OB_FT:[id number]==],[NS_DeliveryAssist1_OBGYN_ALL_OB_FT:[id number]=],[NS_DeliveryRN_OBGYN_ALL_OB_FT:[id number]=],[NS_CirculateRN2_OBGYN_ALL_OB_FT:[id number]] [NS_DeliveryAttending1_OBGYN_ALL_OB_FT:[id number]==],[NS_DeliveryAssist1_OBGYN_ALL_OB_FT:[id number]=],[NS_DeliveryRN_OBGYN_ALL_OB_FT:[id number]=],[NS_CirculateRN2_OBGYN_ALL_OB_FT:[id number]=]

## 2024-05-17 NOTE — OB NEONATOLOGY/PEDIATRICIAN DELIVERY SUMMARY - NSPEDSNEONOTESA_OBGYN_ALL_OB_FT
called for di-di twins c/section due to maternal hypertension and breech presentation of both twins. Strong crying occurred, place on radiant warmer, positioned, suctioned, active and well perfused. HR>100. Respiratory grunting and retracting noted at 5 minutes of life. provided cpap +5/21% for 6 minutes. improved respiratory grunting and retracting. Bilateral club feet R>L. Parents were shown and updated.

## 2024-05-17 NOTE — OB RN DELIVERY SUMMARY - NS_SEPSISRSKCALC_OBGYN_ALL_OB_FT
EOS calculated successfully. EOS Risk Factor: 0.5/1000 live births ([facility] national incidence); GA=37w2d; Temp=99.5; ROM=0.017; GBS='Unknown'; Antibiotics='No antibiotics or any antibiotics < 2 hrs prior to birth'

## 2024-05-17 NOTE — OB PROVIDER H&P - ASSESSMENT
29 yo  at 37w2d with di di twins presenting meeting criteria for gHTN, for c section due to breech/breech presentation.  - Consent signed  - Anesthesia consult  - Ancef for infection ppx    [place] PGY2, Dr. Russell aware

## 2024-05-17 NOTE — OB NEONATOLOGY/PEDIATRICIAN DELIVERY SUMMARY - NSPEDSNEONOTESB_OBGYN_ALL_OB_FT
Called to c/s for twins gestation. C/S for gestation hypertension and breech presentation of both twins. Baby emerged with good cry and tone. DCC deferred, placed on open warmer, dried, stimulated and suctioned. PE findings as stated above. 3 vessel cord. Some intermittent grunting noted, SpO2 at 15 minutes of life 92-98% on RA. Good perfusion. Cleared to transition with parents, please consult NICU as needed.

## 2024-05-17 NOTE — PRE-ANESTHESIA EVALUATION ADULT - HEART RATE (BEATS/MIN)
Patient: Rodrick Maxwell    Procedure Information       Date/Time: 02/29/24 1430    Scheduled providers: Ulysses Darden MD    Procedures:       EGD      COLONOSCOPY    Location: [facility]            Relevant Problems   Anesthesia (within normal limits)      Cardiovascular   (+) ASHD (arteriosclerotic heart disease)   (+) Benign essential hypertension   (+) CAD (coronary artery disease)   (+) Coronary arteriosclerosis   (+) Essential hypertension   (+) Hyperlipidemia   (+) PAD (peripheral artery disease) (CMS/HCC)   (+) Peripheral vascular disease (CMS/HCC)      Endocrine   (+) Non-insulin dependent type 2 diabetes mellitus (CMS/HCC)      GI   (+) Gastroesophageal reflux disease      /Renal   (+) Anemia of chronic renal failure   (+) Stage 2 chronic kidney disease      Neuro/Psych   (+) Carpal tunnel syndrome of right wrist      Hematology   (+) Anemia   (+) Anemia of chronic renal failure      Musculoskeletal   (+) Carpal tunnel syndrome of right wrist       Clinical information reviewed:   Tobacco  Allergies  Meds   Med Hx  Surg Hx   Fam Hx  Soc Hx        NPO Detail:  NPO/Void Status  Date of Last Liquid: 02/28/24  Time of Last Liquid: 1800  Date of Last Solid: 02/28/24  Time of Last Solid: 0600  Last Intake Type: Clear fluids; GI prep         Physical Exam    Airway  Mallampati: III  TM distance: >3 FB  Neck ROM: full     Cardiovascular - normal exam     Dental - normal exam     Pulmonary - normal exam     Abdominal            Anesthesia Plan    History of general anesthesia?: yes  History of complications of general anesthesia?: no    ASA 3     MAC     The patient is not a current smoker.    intravenous induction   Anesthetic plan and risks discussed with patient.  Use of blood products discussed with patient who consented to blood products.    Plan discussed with CRNA.       73

## 2024-05-17 NOTE — OB RN PATIENT PROFILE - NSICDXPASTSURGICALHX_GEN_ALL_CORE_FT
PAST SURGICAL HISTORY:  History of adenoidectomy     S/P tonsillectomy     [place] teeth extracted

## 2024-05-17 NOTE — OB NEONATOLOGY/PEDIATRICIAN DELIVERY SUMMARY - NS_GESTAGEATBIRTHA_OBGYN_ALL_OB_FT
Problem: At Risk for Falls  Goal: # Patient does not fall  Outcome: Not met at discharge, continue plan  Goal: # Takes action to control fall-related risks  Outcome: Not met at discharge, continue plan  Goal: # Verbalizes understanding of fall risk/precautions  Description: Document education using the patient education activity  Outcome: Not met at discharge, continue plan     Problem: At Risk for Injury Due to Fall  Goal: # Patient does not fall  Outcome: Not met at discharge, continue plan  Goal: # Takes action to control condition specific risks  Outcome: Not met at discharge, continue plan  Goal: # Verbalizes understanding of fall-related injury personal risks  Description: Document education using the patient education activity  Outcome: Not met at discharge, continue plan     Problem: Diabetes  Goal: Achieves glycemic balance  Description: Goal is to maintain blood sugar within range with no episodes of hypoglycemia  Outcome: Not met at discharge, continue plan  Goal: Verbalizes understanding of diabetes management including how to use HbA1C to evaluate status of blood sugar over time (Diabetes is NOT a new diagnosis)  Description: Diabetes Education  Outcome: Not met at discharge, continue plan     Problem: VTE, Risk for  Goal: # No s/s of VTE  Outcome: Not met at discharge, continue plan  Goal: # Verbalizes understanding of VTE risk factors and prevention  Description: Document education using the patient education activity.   Outcome: Not met at discharge, continue plan  Goal: Demonstrates ability to administer injectable anticoagulants if ordered for d/c  Description: Document education using the patient education activity.  Outcome: Not met at discharge, continue plan     Problem: VTE (Actual)  Goal: # Verbalizes understanding of VTE and treatment plan  Description: Document education using the patient education activity.   Outcome: Not met at discharge, continue plan     Problem: Fluid Volume  Excess  Goal: Fluid Volume Excess Symptoms Resolved  Description: Treatment often consists of oxygen and respiratory support with diuretic therapy at doses that exceed usual dose (typically doubled).  Monitor patient response to treatment.    Acute dyspnea should resolve quickly if dose is adequate and kidney function is adequate. Dyspnea/SOB should only be observed with Activity after effective treatment. Patient should be able to lie down comfortably, without SOB.  Outcome: Not met at discharge, continue plan  Goal: Oxygenation is maintained (SpO2 greater than or equal to 90% or as ordered)  Outcome: Not met at discharge, continue plan  Goal: Verbalizes understanding of FVE management plan  Description: Document on Patient Education Activity  Outcome: Not met at discharge, continue plan     Problem: Wound or Pressure Injury  Goal: Skin remains intact with no new/deterioration of wound or pressure injury  Outcome: Not met at discharge, continue plan  Goal: Participates in wound care activities  Outcome: Not met at discharge, continue plan     Problem: Delirium, Risk for  Goal: # No symptoms of delirium  Description: Evaluate delirium symptoms under active problem when present  Outcome: Not met at discharge, continue plan  Goal: # Verbalizes understanding of delirium preventive strategies  Description: Document on Patient Education Activity   Outcome: Not met at discharge, continue plan     Problem: Delirium  Goal: # Symptoms of delirium resolved for 24 hours  Description: Evaluate delirium symptoms under active problem when present  Outcome: Not met at discharge, continue plan  Goal: # Verbalizes understanding of delirium symptoms, management, and follow up (family/patient)  Description: Document on Patient Education Activity   Outcome: Not met at discharge, continue plan     Problem: Dysphagia  Goal: # Exhibits no s/s of aspiration  Description: Symptoms of aspiration include coughing, choking, throat clearing,  change in voice quality, and/or a decrease in oxygen saturation by more than 2% points from baseline after swallowing.  Outcome: Not met at discharge, continue plan  Goal: # Verbalizes understanding of dysphagia management  Description: Document education using the patient education activity.  Outcome: Not met at discharge, continue plan     Problem: Hemodialysis  Goal: Vascular access device site free of signs & symptoms of infection  Outcome: Not met at discharge, continue plan  Goal: Verbalizes understanding of hemodialysis care  Description: Document on Patient Education Activity  Outcome: Not met at discharge, continue plan     Problem: Impaired Physical Mobility  Goal: Functional status is maintained or returned to baseline during hospitalization  Outcome: Not met at discharge, continue plan  Goal: Tolerates activity for discharge setting with no abnormal symptoms  Outcome: Not met at discharge, continue plan     Problem: Breathing Pattern Ineffective  Goal: Air exchange is effective, demonstrated by Sp02 sat of greater then or = 92% (or as ordered)  Outcome: Not met at discharge, continue plan  Goal: Respiratory pattern is quiet and regular without report of SOB  Outcome: Not met at discharge, continue plan  Goal: Breathing pattern demonstrates minimal apnea during sleep with appropriate use of airway pressure support devices  Outcome: Not met at discharge, continue plan  Goal: Verbalizes/demonstrates effective breathing management strategies  Description: Document education using the patient education activity.   Outcome: Not met at discharge, continue plan     Problem: Pain  Goal: Acceptable pain level achieved/maintained at rest using appropriate pain scale for the patient  Outcome: Not met at discharge, continue plan  Goal: Acceptable pain level achieved/maintained with activity using appropriate pain scale for the patient  Outcome: Not met at discharge, continue plan  Goal: Acceptable pain level  achieved/maintained without oversedation  Outcome: Not met at discharge, continue plan      37w2d

## 2024-05-17 NOTE — OB PROVIDER H&P - HISTORY OF PRESENT ILLNESS
27 yo  with di di twins at 37w2d presenting with elevated BP in office meeting criteria for gHTN. Denies HA, scotoma, SOB, RUQ pain. Denies vb lof ctx. +Normal FM.     Ante:  -spontaneous pregnancy  -NIPT inconclusive, CVS wnl  -anatomy showed baby B with club foot  -GCT wnl  -no hypertensive disorders of pregnancy  -GBS negative  -EFW A  -EFW B:    Ob Hx: G1    Gyn Hx: denies    PMHx: L5/S1 degenerating disc    PSHx: tonsillectomy    Meds: claritin PRN, PNV    Allergies: NKDA

## 2024-05-17 NOTE — OB PROVIDER DELIVERY SUMMARY - NSPROVIDERDELIVERYNOTE_OBGYN_ALL_OB_FT
65  IVF 1500    primary c section for di di twins, both breech presentation, gestational hypertension  placentas to pathology  Uterus closed with chromic suture  seprafilm placed over hysterotomy  Muscle reapproximated with chromic  fascia closed with vicryl suture  SubQ closed with monocryl suture  Skin closed with monocryl suture

## 2024-05-18 ENCOUNTER — TRANSCRIPTION ENCOUNTER (OUTPATIENT)
Age: 29
End: 2024-05-18

## 2024-05-18 LAB
BASOPHILS # BLD AUTO: 0.02 K/UL — SIGNIFICANT CHANGE UP (ref 0–0.2)
BASOPHILS NFR BLD AUTO: 0.2 % — SIGNIFICANT CHANGE UP (ref 0–2)
EOSINOPHIL # BLD AUTO: 0.02 K/UL — SIGNIFICANT CHANGE UP (ref 0–0.5)
EOSINOPHIL NFR BLD AUTO: 0.2 % — SIGNIFICANT CHANGE UP (ref 0–6)
HCT VFR BLD CALC: 28.9 % — LOW (ref 34.5–45)
HGB BLD-MCNC: 9.5 G/DL — LOW (ref 11.5–15.5)
IMM GRANULOCYTES NFR BLD AUTO: 0.7 % — SIGNIFICANT CHANGE UP (ref 0–0.9)
LYMPHOCYTES # BLD AUTO: 1.85 K/UL — SIGNIFICANT CHANGE UP (ref 1–3.3)
LYMPHOCYTES # BLD AUTO: 15 % — SIGNIFICANT CHANGE UP (ref 13–44)
MCHC RBC-ENTMCNC: 31.4 PG — SIGNIFICANT CHANGE UP (ref 27–34)
MCHC RBC-ENTMCNC: 32.9 GM/DL — SIGNIFICANT CHANGE UP (ref 32–36)
MCV RBC AUTO: 95.4 FL — SIGNIFICANT CHANGE UP (ref 80–100)
MONOCYTES # BLD AUTO: 0.63 K/UL — SIGNIFICANT CHANGE UP (ref 0–0.9)
MONOCYTES NFR BLD AUTO: 5.1 % — SIGNIFICANT CHANGE UP (ref 2–14)
NEUTROPHILS # BLD AUTO: 9.75 K/UL — HIGH (ref 1.8–7.4)
NEUTROPHILS NFR BLD AUTO: 78.8 % — HIGH (ref 43–77)
NRBC # BLD: 0 /100 WBCS — SIGNIFICANT CHANGE UP (ref 0–0)
PLATELET # BLD AUTO: 199 K/UL — SIGNIFICANT CHANGE UP (ref 150–400)
RBC # BLD: 3.03 M/UL — LOW (ref 3.8–5.2)
RBC # FLD: 13.1 % — SIGNIFICANT CHANGE UP (ref 10.3–14.5)
T PALLIDUM AB TITR SER: NEGATIVE — SIGNIFICANT CHANGE UP
WBC # BLD: 12.36 K/UL — HIGH (ref 3.8–10.5)
WBC # FLD AUTO: 12.36 K/UL — HIGH (ref 3.8–10.5)

## 2024-05-18 RX ORDER — IBUPROFEN 200 MG
600 TABLET ORAL EVERY 6 HOURS
Refills: 0 | Status: DISCONTINUED | OUTPATIENT
Start: 2024-05-18 | End: 2024-05-21

## 2024-05-18 RX ADMIN — Medication 30 MILLIGRAM(S): at 06:09

## 2024-05-18 RX ADMIN — Medication 600 MILLIGRAM(S): at 17:53

## 2024-05-18 RX ADMIN — Medication 975 MILLIGRAM(S): at 23:00

## 2024-05-18 RX ADMIN — SIMETHICONE 80 MILLIGRAM(S): 80 TABLET, CHEWABLE ORAL at 22:18

## 2024-05-18 RX ADMIN — Medication 975 MILLIGRAM(S): at 22:06

## 2024-05-18 RX ADMIN — SIMETHICONE 80 MILLIGRAM(S): 80 TABLET, CHEWABLE ORAL at 12:32

## 2024-05-18 RX ADMIN — SIMETHICONE 80 MILLIGRAM(S): 80 TABLET, CHEWABLE ORAL at 17:53

## 2024-05-18 RX ADMIN — Medication 975 MILLIGRAM(S): at 03:20

## 2024-05-18 RX ADMIN — Medication 30 MILLIGRAM(S): at 12:32

## 2024-05-18 RX ADMIN — Medication 975 MILLIGRAM(S): at 09:14

## 2024-05-18 RX ADMIN — ENOXAPARIN SODIUM 40 MILLIGRAM(S): 100 INJECTION SUBCUTANEOUS at 07:38

## 2024-05-18 RX ADMIN — Medication 975 MILLIGRAM(S): at 15:43

## 2024-05-18 RX ADMIN — MAGNESIUM HYDROXIDE 30 MILLILITER(S): 400 TABLET, CHEWABLE ORAL at 22:18

## 2024-05-18 NOTE — LACTATION INITIAL EVALUATION - NS LACT CON REASON FOR REQ
37.2wk  twin gestation baby A, about 17.5 hrs old at this time. Placed the baby STS with the mother while I provided breastfeeding education and explained normal  behaviour and the milk production feedback system. Assisted with positioning in a football hold and taught latch strategies. Baby was able to latch and is feeding well, rhythmically sucking between short pauses of rest. Mother to continue with STS when possible, room-in, and feed as per cues at least 8-12x/ day. Pt will begin pumping today to collect EBM for Twin B in NICU. Pt verbalized understanding and is agreeable with recommendations to begin TFP today. Pt primary nurse will set pt up with an electric pump and instruct on use, clanin and safe EBM stoarge. To f/u as needed./primaparous mom/early term/late  infant/staff request/patient request/NICU admission

## 2024-05-18 NOTE — LACTATION INITIAL EVALUATION - ACTUAL PROBLEM
12/28/2021    Patient Information  Rosi Sánchez                                                                                          [address] [facility] [address]      Chief Complaint:   Chief Complaint   Patient presents with   • Follow-up          History of Present Illness:  Pt is here for obesity. reports she is eating healthy and exercising regularly. No side effects reported. She is requesting refills. No further concerns/complaints.    Review of Systems   Constitutional: Positive for weight loss.   HENT: Negative.    Eyes: Negative.    Cardiovascular: Negative.    Respiratory: Negative.    Endocrine: Negative.    Hematologic/Lymphatic: Negative.    Skin: Negative.    Musculoskeletal: Negative.    Gastrointestinal: Negative.    Genitourinary: Negative.    Neurological: Negative.    Psychiatric/Behavioral: Negative.    Allergic/Immunologic: Negative.        Active Problems:    Patient Active Problem List   Diagnosis   • Obesity (BMI 30.0-34.9)         Past Medical History:   Diagnosis Date   • Allergic    • Arthritis    • Headache    • Sleep apnea          History reviewed. No pertinent surgical history.      No Known Allergies        Current Outpatient Medications:   •  cetirizine (zyrTEC) 10 MG tablet, Take 10 mg by mouth Daily., Disp: , Rfl:   •  phentermine 37.5 MG capsule, Take 1 capsule by mouth Every Morning., Disp: 30 capsule, Rfl: 0  •  vitamin D (ERGOCALCIFEROL) 1.25 MG (95607 UT) capsule capsule, Take 1 capsule by mouth 1 (One) Time Per Week., Disp: 12 capsule, Rfl: 4      Family History   Problem Relation Age of Onset   • Hypertension Mother    • Heart disease Father    • Hypertension Father    • Diabetes Father    • Cancer Father    • Anxiety disorder Father    • Depression Father    • Bipolar disorder Father    • Alcohol abuse Father    • Heart disease Brother    • Hypertension Brother    • Bipolar disorder Brother    • Anxiety disorder Brother    • Depression  "Brother    • Alcohol abuse Brother          Social History     Socioeconomic History   • Marital status: Single   Tobacco Use   • Smoking status: Former Smoker   • Smokeless tobacco: Never Used   Vaping Use   • Vaping Use: Never used   Substance and Sexual Activity   • Alcohol use: Yes   • Drug use: Never   • Sexual activity: Defer         Physical Exam  Constitutional:       Appearance: Normal appearance. She is obese.   Cardiovascular:      Rate and Rhythm: Normal rate and regular rhythm.      Pulses: Normal pulses.      Heart sounds: Normal heart sounds.   Pulmonary:      Effort: Pulmonary effort is normal.      Breath sounds: Normal breath sounds.   Skin:     General: Skin is warm.      Findings: No rash.   Neurological:      General: No focal deficit present.      Mental Status: She is alert and oriented to person, place, and time. Mental status is at baseline.   Psychiatric:         Mood and Affect: Mood normal.         Behavior: Behavior normal.         Thought Content: Thought content normal.         Judgment: Judgment normal.          Vitals:    12/28/21 0842 12/28/21 0853   BP: 119/81    Pulse: 99 82   Resp: 16    Temp: 98 °F (36.7 °C)    SpO2: 97%    Weight: 95.4 kg (210 lb 6.4 oz)    Height: 172.7 cm (67.99\")        Body mass index is 32 kg/m².       Lab/other results:        Assessment/Plan:    Diagnoses and all orders for this visit:    1. Obesity (BMI 30.0-34.9) (Primary)  -     phentermine 37.5 MG capsule; Take 1 capsule by mouth Every Morning.  Dispense: 30 capsule; Refill: 0         Continue phentermine, counseled on healthy diet/exercise. Loosing weight appropriately. [place], controlled substance agreement.      Procedures          " twn gestation, one infant in NICU/knowledge deficit/low supply

## 2024-05-18 NOTE — LACTATION INITIAL EVALUATION - LACTATION INTERVENTIONS
initiate/review safe skin-to-skin/initiate/review hand expression/initiate/review pumping guidelines and safe milk handling/initiate/review techniques for position and latch/initiate/review supplementation plan due to medical indications/review techniques to increase milk supply/initiate/review alternate feeding method/reviewed components of an effective feeding and at least 8 effective feedings per day required/reviewed importance of monitoring infant diapers, the breastfeeding log, and minimum output each day/reviewed benefits and recommendations for rooming in/reviewed feeding on demand/by cue at least 8 times a day/reviewed indications of inadequate milk transfer that would require supplementation

## 2024-05-19 RX ORDER — FERROUS SULFATE 325(65) MG
0 TABLET ORAL
Refills: 0 | DISCHARGE

## 2024-05-19 RX ORDER — ACETAMINOPHEN 500 MG
3 TABLET ORAL
Qty: 0 | Refills: 0 | DISCHARGE
Start: 2024-05-19

## 2024-05-19 RX ORDER — CETIRIZINE HYDROCHLORIDE 10 MG/1
1 TABLET ORAL
Refills: 0 | DISCHARGE

## 2024-05-19 RX ORDER — IBUPROFEN 200 MG
1 TABLET ORAL
Qty: 0 | Refills: 0 | DISCHARGE
Start: 2024-05-19

## 2024-05-19 RX ADMIN — SIMETHICONE 80 MILLIGRAM(S): 80 TABLET, CHEWABLE ORAL at 07:34

## 2024-05-19 RX ADMIN — SIMETHICONE 80 MILLIGRAM(S): 80 TABLET, CHEWABLE ORAL at 14:20

## 2024-05-19 RX ADMIN — Medication 975 MILLIGRAM(S): at 03:56

## 2024-05-19 RX ADMIN — Medication 600 MILLIGRAM(S): at 12:18

## 2024-05-19 RX ADMIN — Medication 975 MILLIGRAM(S): at 03:29

## 2024-05-19 RX ADMIN — Medication 975 MILLIGRAM(S): at 14:20

## 2024-05-19 RX ADMIN — Medication 600 MILLIGRAM(S): at 00:25

## 2024-05-19 RX ADMIN — MAGNESIUM HYDROXIDE 30 MILLILITER(S): 400 TABLET, CHEWABLE ORAL at 18:07

## 2024-05-19 RX ADMIN — Medication 975 MILLIGRAM(S): at 21:05

## 2024-05-19 RX ADMIN — Medication 600 MILLIGRAM(S): at 01:00

## 2024-05-19 RX ADMIN — Medication 600 MILLIGRAM(S): at 06:23

## 2024-05-19 RX ADMIN — ENOXAPARIN SODIUM 40 MILLIGRAM(S): 100 INJECTION SUBCUTANEOUS at 08:11

## 2024-05-19 RX ADMIN — Medication 975 MILLIGRAM(S): at 09:08

## 2024-05-19 RX ADMIN — Medication 600 MILLIGRAM(S): at 18:05

## 2024-05-19 RX ADMIN — Medication 975 MILLIGRAM(S): at 22:00

## 2024-05-19 RX ADMIN — Medication 600 MILLIGRAM(S): at 06:37

## 2024-05-19 NOTE — DISCHARGE NOTE OB - PATIENT PORTAL LINK FT
You can access the FollowMyHealth Patient Portal offered by [facility] by registering at the following website: [email or web address]. By joining [employer]’s FollowMyHealth portal, you will also be able to view your health information using other applications (apps) compatible with our system.

## 2024-05-19 NOTE — DISCHARGE NOTE OB - CARE PROVIDER_API CALL
Shahnaz Russell  Obstetrics and Gynecology  [address] [place] [address]  Phone: [phone]  Fax: [phone]  Follow Up Time:

## 2024-05-19 NOTE — DISCHARGE NOTE OB - HOSPITAL COURSE
Admitted to labor and delivery for  delivery for gestational hypertension and di-di twin gestation. Underwent  delivery. POstpartum course uncomplicated. Meeting postpartum milestones and stable for discharge.

## 2024-05-19 NOTE — DISCHARGE NOTE OB - PLAN OF CARE
- Take Motrin 600mg every 6 hours and/or Tylenol 650mg every 6 hours as needed for pain  - Keep incision site clean and dry.  - Call your OBGYN to schedule a follow up appointment in 1-2 weeks.  - Call your OBGYN if you experiences severe abdominal pain not improved by oral pain medications, heavy bright red vaginal bleeding saturating more than 1 pad per hour, redness/warmth at incision site, drainage from incision site, or fever greater than 100.4F. - Follow up with your OBGYN in 1 week for a blood pressure check.  - Purchase electronic blood pressure cuff at your local pharmacy. Check blood pressure 3x per day.  - If systolic BP (top number) is greater than 160 OR diastolic BP (bottom number) is greater than 110, you develop a headache not relieved by Tylenol, visual disturbances, or right upper abdominal pain, then call your OBGYN or the hospital, or go to your nearest emergency room.  - Regular diet and normal activity as tolerated.  - Nothing in the vagina for 6 weeks (i.e., no sex, tampons, tub baths, or swimming pools)

## 2024-05-19 NOTE — DISCHARGE NOTE OB - CARE PLAN
1 Principal Discharge DX:	Pregnancy, delivered  Assessment and plan of treatment:	- Take Motrin 600mg every 6 hours and/or Tylenol 650mg every 6 hours as needed for pain  - Keep incision site clean and dry.  - Call your OBGYN to schedule a follow up appointment in 1-2 weeks.  - Call your OBGYN if you experiences severe abdominal pain not improved by oral pain medications, heavy bright red vaginal bleeding saturating more than 1 pad per hour, redness/warmth at incision site, drainage from incision site, or fever greater than 100.4F.  Secondary Diagnosis:	Gestational hypertension  Assessment and plan of treatment:	- Follow up with your OBGYN in 1 week for a blood pressure check.  - Purchase electronic blood pressure cuff at your local pharmacy. Check blood pressure 3x per day.  - If systolic BP (top number) is greater than 160 OR diastolic BP (bottom number) is greater than 110, you develop a headache not relieved by Tylenol, visual disturbances, or right upper abdominal pain, then call your OBGYN or the hospital, or go to your nearest emergency room.  - Regular diet and normal activity as tolerated.  - Nothing in the vagina for 6 weeks (i.e., no sex, tampons, tub baths, or swimming pools)

## 2024-05-19 NOTE — DISCHARGE NOTE OB - MEDICATION SUMMARY - MEDICATIONS TO TAKE
I will START or STAY ON the medications listed below when I get home from the hospital:    Prenatal Multivitamin  -- Indication: For Breastfeeding    ibuprofen 600 mg oral tablet  -- 1 tab(s) by mouth every 6 hours  -- Indication: For Pain    acetaminophen 325 mg oral tablet  -- 3 tab(s) by mouth every 6 hours  -- Indication: For Pain

## 2024-05-20 RX ORDER — ZINC OXIDE 200 MG/G
1 OINTMENT TOPICAL THREE TIMES A DAY
Refills: 0 | Status: DISCONTINUED | OUTPATIENT
Start: 2024-05-20 | End: 2024-05-21

## 2024-05-20 RX ORDER — POLYETHYLENE GLYCOL 3350 17 G/17G
17 POWDER, FOR SOLUTION ORAL ONCE
Refills: 0 | Status: COMPLETED | OUTPATIENT
Start: 2024-05-20 | End: 2024-05-20

## 2024-05-20 RX ADMIN — ZINC OXIDE 1 APPLICATION(S): 200 OINTMENT TOPICAL at 23:52

## 2024-05-20 RX ADMIN — Medication 975 MILLIGRAM(S): at 10:00

## 2024-05-20 RX ADMIN — Medication 600 MILLIGRAM(S): at 00:52

## 2024-05-20 RX ADMIN — Medication 600 MILLIGRAM(S): at 07:14

## 2024-05-20 RX ADMIN — SIMETHICONE 80 MILLIGRAM(S): 80 TABLET, CHEWABLE ORAL at 13:09

## 2024-05-20 RX ADMIN — Medication 975 MILLIGRAM(S): at 20:23

## 2024-05-20 RX ADMIN — Medication 975 MILLIGRAM(S): at 04:40

## 2024-05-20 RX ADMIN — Medication 600 MILLIGRAM(S): at 19:00

## 2024-05-20 RX ADMIN — Medication 975 MILLIGRAM(S): at 15:29

## 2024-05-20 RX ADMIN — SIMETHICONE 80 MILLIGRAM(S): 80 TABLET, CHEWABLE ORAL at 00:35

## 2024-05-20 RX ADMIN — Medication 975 MILLIGRAM(S): at 10:30

## 2024-05-20 RX ADMIN — Medication 600 MILLIGRAM(S): at 06:22

## 2024-05-20 RX ADMIN — SIMETHICONE 80 MILLIGRAM(S): 80 TABLET, CHEWABLE ORAL at 21:07

## 2024-05-20 RX ADMIN — Medication 600 MILLIGRAM(S): at 00:35

## 2024-05-20 RX ADMIN — Medication 975 MILLIGRAM(S): at 03:36

## 2024-05-20 RX ADMIN — Medication 600 MILLIGRAM(S): at 12:44

## 2024-05-20 RX ADMIN — Medication 600 MILLIGRAM(S): at 13:15

## 2024-05-20 RX ADMIN — ENOXAPARIN SODIUM 40 MILLIGRAM(S): 100 INJECTION SUBCUTANEOUS at 07:26

## 2024-05-20 RX ADMIN — Medication 600 MILLIGRAM(S): at 23:52

## 2024-05-20 RX ADMIN — Medication 975 MILLIGRAM(S): at 16:00

## 2024-05-20 RX ADMIN — Medication 600 MILLIGRAM(S): at 18:26

## 2024-05-20 RX ADMIN — POLYETHYLENE GLYCOL 3350 17 GRAM(S): 17 POWDER, FOR SOLUTION ORAL at 16:00

## 2024-05-21 VITALS
DIASTOLIC BLOOD PRESSURE: 72 MMHG | SYSTOLIC BLOOD PRESSURE: 107 MMHG | TEMPERATURE: 98 F | RESPIRATION RATE: 17 BRPM | HEART RATE: 72 BPM | OXYGEN SATURATION: 97 %

## 2024-05-21 PROCEDURE — 59050 FETAL MONITOR W/REPORT: CPT

## 2024-05-21 PROCEDURE — 84156 ASSAY OF PROTEIN URINE: CPT

## 2024-05-21 PROCEDURE — 80053 COMPREHEN METABOLIC PANEL: CPT

## 2024-05-21 PROCEDURE — 86901 BLOOD TYPING SEROLOGIC RH(D): CPT

## 2024-05-21 PROCEDURE — 84550 ASSAY OF BLOOD/URIC ACID: CPT

## 2024-05-21 PROCEDURE — 36415 COLL VENOUS BLD VENIPUNCTURE: CPT

## 2024-05-21 PROCEDURE — 88307 TISSUE EXAM BY PATHOLOGIST: CPT

## 2024-05-21 PROCEDURE — 86780 TREPONEMA PALLIDUM: CPT

## 2024-05-21 PROCEDURE — 82570 ASSAY OF URINE CREATININE: CPT

## 2024-05-21 PROCEDURE — 86850 RBC ANTIBODY SCREEN: CPT

## 2024-05-21 PROCEDURE — 86900 BLOOD TYPING SEROLOGIC ABO: CPT

## 2024-05-21 PROCEDURE — 83615 LACTATE (LD) (LDH) ENZYME: CPT

## 2024-05-21 PROCEDURE — C1765: CPT

## 2024-05-21 PROCEDURE — 85025 COMPLETE CBC W/AUTO DIFF WBC: CPT

## 2024-05-21 PROCEDURE — 85384 FIBRINOGEN ACTIVITY: CPT

## 2024-05-21 RX ORDER — POLYETHYLENE GLYCOL 3350 17 G/17G
17 POWDER, FOR SOLUTION ORAL EVERY 24 HOURS
Refills: 0 | Status: DISCONTINUED | OUTPATIENT
Start: 2024-05-21 | End: 2024-05-21

## 2024-05-21 RX ADMIN — ENOXAPARIN SODIUM 40 MILLIGRAM(S): 100 INJECTION SUBCUTANEOUS at 07:12

## 2024-05-21 RX ADMIN — Medication 975 MILLIGRAM(S): at 14:27

## 2024-05-21 RX ADMIN — Medication 1 APPLICATION(S): at 17:39

## 2024-05-21 RX ADMIN — Medication 975 MILLIGRAM(S): at 03:05

## 2024-05-21 RX ADMIN — Medication 975 MILLIGRAM(S): at 15:00

## 2024-05-21 RX ADMIN — Medication 600 MILLIGRAM(S): at 13:15

## 2024-05-21 RX ADMIN — Medication 600 MILLIGRAM(S): at 17:38

## 2024-05-21 RX ADMIN — Medication 600 MILLIGRAM(S): at 12:45

## 2024-05-21 RX ADMIN — Medication 600 MILLIGRAM(S): at 06:17

## 2024-05-21 NOTE — PROGRESS NOTE ADULT - ASSESSMENT
A/P: 28y s/p  section, POD#4, stable  -  Pain: PO motrin and Tylenol, oxycodone for severe pain PRN  -  GI: tolerating regular diet, passing gas  -  : s/p ramsey , urinating without difficulty  -  DVT prophylaxis: encouraged increased ambulation, SCDs, SQL  -  Dispo: POD 3 or 4
A/P  28y s/p primary c/s, POD #2, meeting milestones    Diet: reg diet  Pain: OPM  IV fluid: po hydration  OOB/SCDs/IS  Continue to monitor  Anticipate discharge POD #3 or #4
A/P: 28y s/p  section, POD#3, stable  - Ambulation, simethicone, hot tea encouraged to help pass gas  -  Pain: PO motrin and Tylenol, oxycodone for severe pain PRN  -  GI: tolerating regular diet, passing gas  -  : s/p ramsey , urinating without difficulty  -  DVT prophylaxis: encouraged increased ambulation, SCDs, SQL  -  Dispo: POD 3 or 4
28y Female POD#1 s/p C/S for di/di twins with gHTN  - Blood pressures normotensive overnight. Denies toxic complaints.                             - Neuro/Pain: toradol atc, tylenol atc, oxy prn  - CV: VSq4h, AM CBC pending  - Pulm: Encourage ISS & Ambulation  - GI: Advance as tolerated  - : Noble in place, to be removed this morning, TOV this afternoon  - DVT ppx: SCDs, Lovenox 40mg QD  - Dispo: POD #2/3
A/P 28y s/p c/s, POD # 4, meeting milestones    dc home  instructions reviewed
A/P: 28y s/p  section, POD#2, stable  - gHTN: normotensive, no toxic complaints  -  Pain: PO motrin q6hrs, tylenol q8hrs, oxycodone for severe pain PRN  -  Post-operatively labs: post-op Hgb , hemodynamically stable, no symptoms of anemia   -  GI: tolerating regular diet, passing gas  -  : s/p ramsey , urinating without difficulty  -  DVT prophylaxis: encouraged increased ambulation, SCDs, Lovenox  -  Dispo: POD 3 or 4

## 2024-05-21 NOTE — PROGRESS NOTE ADULT - SUBJECTIVE AND OBJECTIVE BOX
Patient evaluated at bedside this morning, resting comfortable in bed.   She reports pain is well controlled with Tylenol and Motrin.   Reports decrease in amount of vaginal bleeding  She has been ambulating without assistance, voiding spontaneously, passing gas, and tolerating regular diet without nausea/vomiting.    Physical Exam:  Vital Signs Last 24 Hrs  T(C): 36.4 (20 May 2024 05:13), Max: 36.6 (19 May 2024 09:59)  T(F): 97.5 (20 May 2024 05:13), Max: 97.9 (19 May 2024 09:59)  HR: 70 (20 May 2024 05:13) (70 - 91)  BP: 115/77 (20 May 2024 05:13) (104/57 - 115/77)  BP(mean): --  RR: 18 (20 May 2024 05:13) (17 - 18)  SpO2: 97% (20 May 2024 05:13) (97% - 100%)        GA: Alert, comfortable, NAD  Pulm: Breathing comfortably on RA  Abd: Uterine fundus firm, tenderness appropriate with post-op state, incision clean/dry/intact. +Distension with gas.   Extremities: WNL                  
Patient evaluated at bedside this morning, resting comfortable in bed.   She reports pain is well controlled.  She denies headache, scotoma, RUQ/epigastric pain, dizziness, chest pain, palpitations, shortness of breathe, nausea, vomiting or heavy vaginal bleeding.  She has been ambulating without assistance, voiding spontaneously, passing gas, tolerating regular diet and is breastfeeding.    Physical Exam:  Vital Signs Last 24 Hrs  T(C): 36.3 (19 May 2024 06:10), Max: 36.7 (18 May 2024 09:30)  T(F): 97.4 (19 May 2024 06:10), Max: 98 (18 May 2024 09:30)  HR: 75 (19 May 2024 06:10) (75 - 83)  BP: 125/77 (19 May 2024 06:10) (102/66 - 125/77)  BP(mean): --  RR: 18 (19 May 2024 06:10) (18 - 18)  SpO2: 97% (19 May 2024 06:10) (94% - 100%)    Parameters below as of 18 May 2024 22:15  Patient On (Oxygen Delivery Method): room air        GA: NAD, A+0 x 3  Abd: soft, nontender, nondistended, no rebound or guarding, incision clean, dry and intact, uterus firm at midline and below umbilicus  : lochia WNL  Extremities: no swelling or calf tenderness                             9.5    12.36 )-----------( 199      ( 18 May 2024 05:30 )             28.9     05-17    140  |  105  |  9   ----------------------------<  82  4.2   |  23  |  0.70    Ca    10.0      17 May 2024 12:33    TPro  6.7  /  Alb  3.7  /  TBili  0.5  /  DBili  x   /  AST  19  /  ALT  12  /  AlkPhos  203<H>  05-17        
Patient evaluated at bedside. No acute events overnight. She reports pain is well controlled with OPM. She is ambulating, voiding, tolerating po, passing flatus. She denies headache, dizziness, chest pain, palpitations, shortness of breath, nausea, vomiting or heavy vaginal bleeding.      Physical Exam:  Vital Signs Last 24 Hrs  T(C): 36.6 (21 May 2024 05:45), Max: 36.6 (21 May 2024 05:45)  T(F): 97.8 (21 May 2024 05:45), Max: 97.8 (21 May 2024 05:45)  HR: 67 (21 May 2024 05:45) (61 - 75)  BP: 101/66 (21 May 2024 05:45) (101/66 - 119/84)  BP(mean): --  RR: 18 (21 May 2024 05:45) (18 - 18)  SpO2: 97% (21 May 2024 05:45) (97% - 99%)    Parameters below as of 21 May 2024 05:45  Patient On (Oxygen Delivery Method): room air        GA: NAD, A+0 x 3  Abd: + BS, soft, nontender, nondistended, no rebound or guarding, uterus firm at midline, 2 fb below umbilicus  Incision clean, dry and intact  : lochia WNL  Extremities: no swelling or calf tenderness                  
Patient evaluated at bedside. Pt reports feeling well.   She reports pain is well controlled with OPM.  She has not yet been ambulating without assistance or voiding spontaneously. She is passing gas, tolerating regular diet.  She denies HA, dizziness, CP, palpitations, SOB, n/v, or heavy vaginal bleeding.    Physical Exam:  Vital Signs Last 24 Hrs  T(C): 36.4 (18 May 2024 05:30), Max: 37.5 (17 May 2024 12:28)  T(F): 97.5 (18 May 2024 05:30), Max: 99.5 (17 May 2024 12:28)  HR: 68 (18 May 2024 05:30) (60 - 78)  BP: 93/53 (18 May 2024 05:30) (93/53 - 130/86)  BP(mean): 88 (17 May 2024 19:37) (71 - 88)  RR: 18 (18 May 2024 05:30) (16 - 18)  SpO2: 96% (18 May 2024 05:30) (95% - 100%)    Parameters below as of 18 May 2024 05:30  Patient On (Oxygen Delivery Method): room air        Gen: NAD  Abd: + BS, soft, nontender, nondistended, no rebound or guarding  Incision clean, dry and intact  uterus firm at midline  : lochia WNL  Extremities: no swelling or calf tenderness                          11.9   11.30 )-----------( 250      ( 17 May 2024 12:33 )             34.7     05-17    140  |  105  |  9   ----------------------------<  82  4.2   |  23  |  0.70    Ca    10.0      17 May 2024 12:33    TPro  6.7  /  Alb  3.7  /  TBili  0.5  /  DBili  x   /  AST  19  /  ALT  12  /  AlkPhos  203<H>  05-17        
Patient evaluated at bedside this morning, resting comfortable in bed.   She reports pain is well controlled with Tylenol and Motrin.   Reports decrease in amount of vaginal bleeding  She has been ambulating without assistance, voiding spontaneously, passing gas, and tolerating regular diet without nausea/vomiting.    Physical Exam:  Vital Signs Last 24 Hrs  T(C): 36.6 (21 May 2024 05:45), Max: 36.6 (21 May 2024 05:45)  T(F): 97.8 (21 May 2024 05:45), Max: 97.8 (21 May 2024 05:45)  HR: 67 (21 May 2024 05:45) (61 - 75)  BP: 101/66 (21 May 2024 05:45) (101/66 - 119/84)  BP(mean): --  RR: 18 (21 May 2024 05:45) (18 - 18)  SpO2: 97% (21 May 2024 05:45) (97% - 99%)    Parameters below as of 21 May 2024 05:45  Patient On (Oxygen Delivery Method): room air        GA: Alert, comfortable, NAD  Pulm: Breathing comfortably on RA  Abd: Uterine fundus firm, tenderness appropriate with post-op state, incision clean/dry/intact  Extremities: WNL
Patient evaluated at bedside. No acute events overnight. She reports pain is well controlled with OPM. Adequate urine output. Has not yet had anything PO nor attempted ambulation. She denies headache, dizziness, chest pain, palpitations, shortness of breath, nausea, vomiting or heavy vaginal bleeding.      Physical Exam:  Vital Signs Last 24 Hrs  T(C): 36.4 (19 May 2024 02:00), Max: 36.7 (18 May 2024 09:30)  T(F): 97.6 (19 May 2024 02:00), Max: 98 (18 May 2024 09:30)  HR: 77 (19 May 2024 02:00) (68 - 83)  BP: 111/74 (19 May 2024 02:00) (93/53 - 121/76)  BP(mean): --  RR: 18 (19 May 2024 02:00) (18 - 18)  SpO2: 97% (19 May 2024 02:00) (94% - 100%)    Parameters below as of 18 May 2024 22:15  Patient On (Oxygen Delivery Method): room air        GA: NAD, A+0 x 3  Abd: + BS, soft, nontender, nondistended, no rebound or guarding, uterus firm at midline, 2 fb below umbilicus  Incision clean, dry and intact  : lochia WNL  Extremities: no swelling or calf tenderness                            9.5    12.36 )-----------( 199      ( 18 May 2024 05:30 )             28.9     05-17    140  |  105  |  9   ----------------------------<  82  4.2   |  23  |  0.70    Ca    10.0      17 May 2024 12:33    TPro  6.7  /  Alb  3.7  /  TBili  0.5  /  DBili  x   /  AST  19  /  ALT  12  /  AlkPhos  203<H>  05-17

## 2024-05-22 ENCOUNTER — NON-APPOINTMENT (OUTPATIENT)
Age: 29
End: 2024-05-22

## 2024-05-22 DIAGNOSIS — O13.9 GESTATIONAL [PREGNANCY-INDUCED] HYPERTENSION W/OUT SIGNIFICANT PROTEINURIA, UNSPECIFIED TRIMESTER: ICD-10-CM

## 2024-05-22 RX ORDER — ACETAMINOPHEN 500 MG/1
500 TABLET ORAL
Refills: 0 | Status: ACTIVE | COMMUNITY
Start: 2024-05-22

## 2024-05-22 RX ORDER — IBUPROFEN 600 MG/1
600 TABLET, FILM COATED ORAL EVERY 6 HOURS
Refills: 0 | Status: ACTIVE | COMMUNITY
Start: 2024-05-22

## 2024-05-22 RX ORDER — PRENATAL VIT NO.126/IRON/FOLIC 28MG-0.8MG
28-0.8 TABLET ORAL DAILY
Refills: 0 | Status: ACTIVE | COMMUNITY
Start: 2024-05-22

## 2024-05-24 ENCOUNTER — NON-APPOINTMENT (OUTPATIENT)
Age: 29
End: 2024-05-24

## 2024-05-28 ENCOUNTER — NON-APPOINTMENT (OUTPATIENT)
Age: 29
End: 2024-05-28

## 2024-05-30 DIAGNOSIS — O30.043 TWIN PREGNANCY, DICHORIONIC/DIAMNIOTIC, THIRD TRIMESTER: ICD-10-CM

## 2024-05-30 DIAGNOSIS — Z3A.37 37 WEEKS GESTATION OF PREGNANCY: ICD-10-CM

## 2024-05-30 DIAGNOSIS — Z28.09 IMMUNIZATION NOT CARRIED OUT BECAUSE OF OTHER CONTRAINDICATION: ICD-10-CM

## 2024-06-08 LAB — SURGICAL PATHOLOGY STUDY: SIGNIFICANT CHANGE UP

## 2024-06-11 ENCOUNTER — NON-APPOINTMENT (OUTPATIENT)
Age: 29
End: 2024-06-11

## 2024-06-26 ENCOUNTER — APPOINTMENT (OUTPATIENT)
Age: 29
End: 2024-06-26

## 2024-06-28 ENCOUNTER — NON-APPOINTMENT (OUTPATIENT)
Age: 29
End: 2024-06-28

## 2024-09-25 NOTE — OB RN PATIENT PROFILE - NS_ARRIVALFROM_OBGYN_ALL_OB
1. COVID-19      ED Disposition       ED Disposition   Discharge    Condition   Stable    Date/Time   Tue Sep 24, 2024 10:54 PM    Comment   Adeline Jackman discharge to home/self care.                   Assessment & Plan       Medical Decision Making  Amount and/or Complexity of Data Reviewed  Labs: ordered.    Risk  Prescription drug management.                     Medications   ondansetron (ZOFRAN-ODT) dispersible tablet 4 mg (4 mg Oral Given 9/24/24 2215)       History of Present Illness       HPI: Patient is a 19 y.o. female who presents with 2 days of cough, sore throat, fatigue, myalgias, and vomiting which the patient describes at mild The patient has not had contact with people with similar symptoms.  The patient has not taken any medication.    Not on File    History reviewed. No pertinent past medical history.   History reviewed. No pertinent surgical history.      Nursing notes reviewed  Physical Exam:  ED Triage Vitals [09/24/24 2132]  Temperature: 98.9 °F (37.2 °C)  Pulse: 87  Respirations: 20  Blood Pressure: 115/64  SpO2: 100 %  Temp Source: Oral  Heart Rate Source: Monitor  Patient Position - Orthostatic VS: Sitting  BP Location: Left arm  FiO2 (%): n/a  Pain Score: 9    ROS: Positive for congestion and myalgias and cough and sore throat, the remainder of a 10 organ system ROS was otherwise unremarkable.  General: awake, alert, no acute distress    Head: normocephalic, atraumatic    Eyes: no scleral icterus  Ears: external ears normal, hearing grossly intact  Nose: external exam grossly normal, positive nasal discharge  Neck: symmetric, No JVD noted, trachea midline  Pulmonary: no respiratory distress, no tachypnea noted  Cardiovascular: appears well perfused  Abdomen: no distention noted  Musculoskeletal: no deformities noted, tone normal  Neuro: grossly non-focal  Psych: mood and affect appropriate    The patient is stable and has a history and physical exam consistent with a viral illness.  COVID19 testing has been performed.  I considered the patient's other medical conditions as applicable/noted above in my medical decision making.  The patient is stable upon discharge. The plan is for supportive care at home.    The patient (and any family present) verbalized understanding of the discharge instructions and warnings that would necessitate return to the Emergency Department.  Patient was positive for COVID.  She had urinalysis secondary to her being pregnant.  No signs of infection on urinalysis.  Encouraged supportive care.  Zofran prescription sent and patient was discharged. All questions were answered prior to discharge.    Medications  ondansetron (ZOFRAN-ODT) dispersible tablet 4 mg (4 mg Oral Given 9/24/24 2214)  Final diagnoses:  COVID-19  Time reflects when diagnosis was documented in both MDM as applicable and   the Disposition within this note     Time User Action Codes Description Comment    9/24/2024 10:55 PM Oscar Hayden Add [U07.1] COVID-19       ED Disposition     ED Disposition   Discharge    Condition   Stable    Date/Time   Tue Sep 24, 2024 10:54 PM    Comment   Adeline Jackman discharge to home/self care.               Follow-up Information     Follow up With Specialties Details Why Contact Info Additional   Information    [facility] Emergency Department Emergency Medicine Go to    If symptoms worsen, As needed [address] [place] [id number]  [phone] [facility] Emergency Department, [address] [place] [address], [phone]    [facility] Family Medicine Call  To schedule an appointment   to establish care with a primary care provider [address] [place] [phone]  [phone] [facility], [address] [place] [address], [phone]   [phone]      Discharge Medication List as of 9/24/2024 10:55 PM    START taking these  medications    ondansetron (ZOFRAN-ODT) 4 mg disintegrating tablet  Take 1 tablet (4 mg total) by mouth every 6 (six) hours as needed for nausea or vomiting, Starting Tue 9/24/2024, Normal        No discharge procedures on file.    Electronically Signed by            Review of Systems        Objective     ED Triage Vitals [09/24/24 2132]   Temperature Pulse Blood Pressure Respirations SpO2 Patient Position - Orthostatic VS   98.9 °F (37.2 °C) 87 115/64 20 100 % Sitting      Temp Source Heart Rate Source BP Location FiO2 (%) Pain Score    Oral Monitor Left arm -- 9        Physical Exam    Labs Reviewed   COVID-19/INFLUENZA A/B RAPID ANTIGEN (30 MIN.TAT) - Abnormal       Result Value    SARS COV Rapid Antigen Positive (*)     Influenza A Rapid Antigen Negative      Influenza B Rapid Antigen Negative      Narrative:     This test has been performed using the Tatangoidel Mikayla 2 FLU+SARS Antigen test under the Emergency Use Authorization (EUA). This test has been validated by the  and verified by the performing laboratory. The Mikayla uses lateral flow immunofluorescent sandwich assay to detect SARS-COV, Influenza A and Influenza B Antigen.     The Quidel Mikayla 2 SARS Antigen test does not differentiate between SARS-CoV and SARS-CoV-2.     Negative results are presumptive and may be confirmed with a molecular assay, if necessary, for patient management. Negative results do not rule out SARS-CoV-2 or influenza infection and should not be used as the sole basis for treatment or patient management decisions. A negative test result may occur if the level of antigen in a sample is below the limit of detection of this test.     Positive results are indicative of the presence of viral antigens, but do not rule out bacterial infection or co-infection with other viruses.     All test results should be used as an adjunct to clinical observations and other information available to the provider.    FOR PEDIATRIC PATIENTS -  copy/paste COVID Guidelines URL to browser: [email or web address]   UA W REFLEX TO MICROSCOPIC WITH REFLEX TO CULTURE - Abnormal    Color, UA Yellow      Clarity, UA Clear      Specific Gravity, UA 1.020      pH, UA 6.5      Leukocytes, UA Negative      Nitrite, UA Negative      Protein, UA Trace (*)     Glucose, UA Negative      Ketones, UA Negative      Urobilinogen, UA <2.0      Bilirubin, UA Negative      Occult Blood, UA Negative      URINE COMMENT       URINE MICROSCOPIC - Abnormal    RBC, UA 0-1      WBC, UA 0-1      Epithelial Cells Occasional      Bacteria, UA Occasional      MUCUS THREADS Occasional (*)     URINE COMMENT       URINE CULTURE     No orders to display       Procedures    ED Medication and Procedure Management   None     Discharge Medication List as of 9/24/2024 10:55 PM        START taking these medications    Details   ondansetron (ZOFRAN-ODT) 4 mg disintegrating tablet Take 1 tablet (4 mg total) by mouth every 6 (six) hours as needed for nausea or vomiting, Starting Tue 9/24/2024, Normal           No discharge procedures on file.     Oscar Hayden MD  09/25/24 0028     Doctor's Office

## 2025-07-04 NOTE — OB PROVIDER DELIVERY SUMMARY - NSVAGINALEXAMCERT_OBGYN_ALL_OB
Problem: Adult Inpatient Plan of Care  Goal: Plan of Care Review  Outcome: Progressing  Goal: Patient-Specific Goal (Individualized)  Outcome: Progressing  Goal: Absence of Hospital-Acquired Illness or Injury  Outcome: Progressing  Goal: Optimal Comfort and Wellbeing  Outcome: Progressing  Goal: Readiness for Transition of Care  Outcome: Progressing      The Delivery OB Provider certifies that vaginal examination and/or abdominal examination after the delivery was done and no foreign body was found.